# Patient Record
(demographics unavailable — no encounter records)

---

## 2024-10-14 NOTE — HISTORY OF PRESENT ILLNESS
[de-identified] : 56yo old noticed sudden right ear noise that sounded like crickets last week. She had a bad ear infection in the right ear back in May also. She feels the hearing went down a little but then came back. She notes the ringing/sound has decreased but she is still hearing it lightly. She feels her hearing is back to normal. No vertigo. She also notes some PND and intermittent nasal congestion since this sinus infection back in May.

## 2024-10-14 NOTE — PROCEDURE
[FreeTextEntry6] : Reason for nasal endoscopy: anterior rhinoscopy insufficient to account for symptoms.  Flexible scope #2 was used. Right nasal passage with normal inferior, middle and superior turbinates. Nasal passage patent with clear middle meatus and sphenoethmoid recess. Left nasal passage with normal inferior, middle and superior turbinates. Nasal passage was patent with clear middle meatus and sphenoethmoid recess. No mucopurulence or polyps appreciated. Left DNS. Nasopharynx clear.

## 2024-10-14 NOTE — ASSESSMENT
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

## 2024-11-25 NOTE — HISTORY OF PRESENT ILLNESS
[de-identified] : 54yo old noticed sudden right ear noise that sounded like crickets last week. She had a bad ear infection in the right ear back in May also. She feels the hearing went down a little but then came back. She notes the ringing/sound has decreased but she is still hearing it lightly. She feels her hearing is back to normal. No vertigo. She also notes some PND and intermittent nasal congestion since this sinus infection back in May.  [FreeTextEntry1] : She is here for f/u. She took the course of the steroids, but she is still having the noise in her ear.  Pt denies any ear pain, drainage, or worsening hearing loss.

## 2024-11-25 NOTE — HISTORY OF PRESENT ILLNESS
[de-identified] : 54yo old noticed sudden right ear noise that sounded like crickets last week. She had a bad ear infection in the right ear back in May also. She feels the hearing went down a little but then came back. She notes the ringing/sound has decreased but she is still hearing it lightly. She feels her hearing is back to normal. No vertigo. She also notes some PND and intermittent nasal congestion since this sinus infection back in May.  [FreeTextEntry1] : She is here for f/u. She took the course of the steroids, but she is still having the noise in her ear.  Pt denies any ear pain, drainage, or worsening hearing loss.

## 2024-11-25 NOTE — ASSESSMENT
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 Houston to review parotid scan and us and ensure no need for biopsy

## 2024-11-25 NOTE — END OF VISIT
[FreeTextEntry3] : I personally saw and examined Ms. PHOENIX LIVINGSTON in detail this visit today. I personally reviewed the HPI, PMH, FH, SH, ROS and medications/allergies. I have spoken to EMILY Boucher regarding the history and have personally determined the assessment and plan of care, and documented this myself. I was present and participated in all key portions of the encounter and all procedures noted above. I have made changes in the body of the note where appropriate.   Attesting Faculty: See Attending Signature Below

## 2024-11-25 NOTE — ASSESSMENT
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 Houston to review parotid scan and us and ensure no need for biopsy

## 2024-12-12 NOTE — PLAN
[TextEntry] : - 7 mm deep parotid nodule on the R according to recent MRI for other reasons. - US in the office today showed a few R parotid LNs with normal features. Not clear if the MRI finding was seen on US today since we dont have the images. - Discussed findings with the patient today and will request the images and order a parotid US. - Return in 6 weeks.

## 2024-12-12 NOTE — HISTORY OF PRESENT ILLNESS
[de-identified] : Referred by Dr. Patten for a R parotid nodule as noted on a MRI from 11/13/24. No biopsies done in the past.  No swelling noted to the side.  Denies concerns for dysphagia, dyspnea, dysphonia, unintentional weight loss, night sweats, chills.   No smoking hx or ETOH usage.

## 2024-12-12 NOTE — HISTORY OF PRESENT ILLNESS
[de-identified] : Referred by Dr. Patten for a R parotid nodule as noted on a MRI from 11/13/24. No biopsies done in the past.  No swelling noted to the side.  Denies concerns for dysphagia, dyspnea, dysphonia, unintentional weight loss, night sweats, chills.   No smoking hx or ETOH usage.

## 2025-01-16 NOTE — HISTORY OF PRESENT ILLNESS
[de-identified] : 54 y/o F previously referred by Dr. Patten for a R parotid nodule as noted on a MRI from 11/13/24. No biopsies done in the past.  No new swelling noted to the side.  Denies concerns for dysphagia, dyspnea, dysphonia, unintentional weight loss, night sweats, chills.  No smoking hx or ETOH usage.  US PAROTID 01/07/25:  IMPRESSION:  *Single subcentimeter TR-1 cystic nodule in the left thyroid lobe. *Otherwise normal thyroid ultrasound. *No parotid mass.

## 2025-01-16 NOTE — PLAN
[TextEntry] : - 7 mm deep parotid nodule on the R according to recent MRI for other reasons. - US in the office showed a few R parotid LNs with normal features. US from outside showed a subcentimeter thyroid cyst and no parotid nodules. MRI images reviewed with the patient and did not image the whole lesion. -  Will request a neck MRI. - Return with results.

## 2025-04-11 NOTE — PLAN
[TextEntry] : - 7 mm deep parotid nodule on the R according to recent MRI for other reasons. - US from outside showed a subcentimeter thyroid cyst and no parotid nodules. MRI images reviewed with the patient and did not image the whole lesion. - Recent neck MRI showing multiple T2 hyperintense lesions within the R parotid suggestive of LNs. Could not ro neoplasm. US in the office today suggestive of intra parotid LNs. Will request new US in 4 months to confirm stability of the findings. - Return depending on results.

## 2025-04-11 NOTE — HISTORY OF PRESENT ILLNESS
[de-identified] : 55 y/o F previously referred by Dr. Patten for a R parotid nodule as noted on a MRI from 11/13/24. No biopsies done in the past.  No smoking hx or ETOH usage. US PAROTID 01/07/25:  IMPRESSION: *Single subcentimeter TR-1 cystic nodule in the left thyroid lobe. *Otherwise normal thyroid ultrasound. *No parotid mass.   MRI of neck on 2/11/25 at  was done and pt is here to review the results.   Pt denies dysphonia, dysphagia, pain, SOB, otalgia.

## 2025-04-11 NOTE — HISTORY OF PRESENT ILLNESS
[de-identified] : 55 y/o F previously referred by Dr. Patten for a R parotid nodule as noted on a MRI from 11/13/24. No biopsies done in the past.  No smoking hx or ETOH usage. US PAROTID 01/07/25:  IMPRESSION: *Single subcentimeter TR-1 cystic nodule in the left thyroid lobe. *Otherwise normal thyroid ultrasound. *No parotid mass.   MRI of neck on 2/11/25 at  was done and pt is here to review the results.   Pt denies dysphonia, dysphagia, pain, SOB, otalgia.